# Patient Record
Sex: MALE | Race: OTHER | Employment: UNEMPLOYED
[De-identification: names, ages, dates, MRNs, and addresses within clinical notes are randomized per-mention and may not be internally consistent; named-entity substitution may affect disease eponyms.]

---

## 2023-10-12 ENCOUNTER — HOSPITAL ENCOUNTER (OUTPATIENT)
Facility: HOSPITAL | Age: 48
Setting detail: SPECIMEN
Discharge: HOME | End: 2023-10-15

## 2023-10-12 LAB
ALBUMIN SERPL-MCNC: 4.1 G/DL (ref 3.5–5)
ALBUMIN/GLOB SERPL: 1.3 (ref 1.1–2.2)
ALP SERPL-CCNC: 101 U/L (ref 45–117)
ALT SERPL-CCNC: 80 U/L (ref 12–78)
ANION GAP SERPL CALC-SCNC: 7 MMOL/L (ref 5–15)
AST SERPL-CCNC: 36 U/L (ref 15–37)
BILIRUB SERPL-MCNC: 0.5 MG/DL (ref 0.2–1)
BUN SERPL-MCNC: 16 MG/DL (ref 6–20)
BUN/CREAT SERPL: 19 (ref 12–20)
CALCIUM SERPL-MCNC: 9.6 MG/DL (ref 8.5–10.1)
CHLORIDE SERPL-SCNC: 112 MMOL/L (ref 97–108)
CHOLEST SERPL-MCNC: 189 MG/DL
CO2 SERPL-SCNC: 27 MMOL/L (ref 21–32)
CREAT SERPL-MCNC: 0.86 MG/DL (ref 0.7–1.3)
ERYTHROCYTE [DISTWIDTH] IN BLOOD BY AUTOMATED COUNT: 13.9 % (ref 11.5–14.5)
EST. AVERAGE GLUCOSE BLD GHB EST-MCNC: 108 MG/DL
GLOBULIN SER CALC-MCNC: 3.1 G/DL (ref 2–4)
GLUCOSE SERPL-MCNC: 89 MG/DL (ref 65–100)
HBA1C MFR BLD: 5.4 % (ref 4–5.6)
HCT VFR BLD AUTO: 43.5 % (ref 36.6–50.3)
HDLC SERPL-MCNC: 51 MG/DL
HDLC SERPL: 3.7 (ref 0–5)
HGB BLD-MCNC: 14.5 G/DL (ref 12.1–17)
LDLC SERPL CALC-MCNC: 110 MG/DL (ref 0–100)
MCH RBC QN AUTO: 30 PG (ref 26–34)
MCHC RBC AUTO-ENTMCNC: 33.3 G/DL (ref 30–36.5)
MCV RBC AUTO: 89.9 FL (ref 80–99)
NRBC # BLD: 0 K/UL (ref 0–0.01)
NRBC BLD-RTO: 0 PER 100 WBC
PLATELET # BLD AUTO: 263 K/UL (ref 150–400)
PMV BLD AUTO: 12 FL (ref 8.9–12.9)
POTASSIUM SERPL-SCNC: 4.1 MMOL/L (ref 3.5–5.1)
PROT SERPL-MCNC: 7.2 G/DL (ref 6.4–8.2)
RBC # BLD AUTO: 4.84 M/UL (ref 4.1–5.7)
SODIUM SERPL-SCNC: 146 MMOL/L (ref 136–145)
TRIGL SERPL-MCNC: 140 MG/DL
TSH SERPL DL<=0.05 MIU/L-ACNC: 1.51 UIU/ML (ref 0.36–3.74)
VIT B12 SERPL-MCNC: 954 PG/ML (ref 193–986)
VLDLC SERPL CALC-MCNC: 28 MG/DL
WBC # BLD AUTO: 7.5 K/UL (ref 4.1–11.1)

## 2023-10-12 PROCEDURE — 86592 SYPHILIS TEST NON-TREP QUAL: CPT

## 2023-10-12 PROCEDURE — 83036 HEMOGLOBIN GLYCOSYLATED A1C: CPT

## 2023-10-12 PROCEDURE — 85027 COMPLETE CBC AUTOMATED: CPT

## 2023-10-12 PROCEDURE — 80053 COMPREHEN METABOLIC PANEL: CPT

## 2023-10-12 PROCEDURE — 80061 LIPID PANEL: CPT

## 2023-10-12 PROCEDURE — 84443 ASSAY THYROID STIM HORMONE: CPT

## 2023-10-12 PROCEDURE — 82607 VITAMIN B-12: CPT

## 2023-10-12 PROCEDURE — 86618 LYME DISEASE ANTIBODY: CPT

## 2023-10-12 PROCEDURE — 36415 COLL VENOUS BLD VENIPUNCTURE: CPT

## 2023-10-13 LAB — RPR SER QL: NONREACTIVE

## 2023-10-16 LAB — LYME ANTIBODY: NEGATIVE

## 2023-10-25 ENCOUNTER — OFFICE VISIT (OUTPATIENT)
Age: 48
End: 2023-10-25

## 2023-10-25 DIAGNOSIS — Z71.89 COUNSELING AND COORDINATION OF CARE: Primary | ICD-10-CM

## 2023-10-25 PROCEDURE — 99080 SPECIAL REPORTS OR FORMS: CPT | Performed by: FAMILY MEDICINE

## 2023-10-25 NOTE — PROGRESS NOTES
AN financial screening is incomplete. Patient has been scheduled for [facility] on 10/31/23 to complete financial screening.

## 2023-10-31 ENCOUNTER — OFFICE VISIT (OUTPATIENT)
Age: 48
End: 2023-10-31

## 2023-10-31 ENCOUNTER — TELEMEDICINE (OUTPATIENT)
Age: 48
End: 2023-10-31

## 2023-10-31 DIAGNOSIS — Z71.89 COUNSELING AND COORDINATION OF CARE: Primary | ICD-10-CM

## 2023-10-31 DIAGNOSIS — H53.133 VISION, LOSS, SUDDEN, BILATERAL: Primary | ICD-10-CM

## 2023-10-31 DIAGNOSIS — R74.8 ABNORMAL LIVER ENZYMES: ICD-10-CM

## 2023-10-31 DIAGNOSIS — Z82.49 FAMILY HISTORY OF EARLY CAD: ICD-10-CM

## 2023-10-31 PROCEDURE — 99442 PR PHYS/QHP TELEPHONE EVALUATION 11-20 MIN: CPT | Performed by: FAMILY MEDICINE

## 2023-10-31 PROCEDURE — 99080 SPECIAL REPORTS OR FORMS: CPT | Performed by: FAMILY MEDICINE

## 2023-10-31 SDOH — ECONOMIC STABILITY: HOUSING INSECURITY
IN THE LAST 12 MONTHS, WAS THERE A TIME WHEN YOU DID NOT HAVE A STEADY PLACE TO SLEEP OR SLEPT IN A SHELTER (INCLUDING NOW)?: NO

## 2023-10-31 SDOH — ECONOMIC STABILITY: FOOD INSECURITY: WITHIN THE PAST 12 MONTHS, YOU WORRIED THAT YOUR FOOD WOULD RUN OUT BEFORE YOU GOT MONEY TO BUY MORE.: SOMETIMES TRUE

## 2023-10-31 SDOH — ECONOMIC STABILITY: FOOD INSECURITY: WITHIN THE PAST 12 MONTHS, THE FOOD YOU BOUGHT JUST DIDN'T LAST AND YOU DIDN'T HAVE MONEY TO GET MORE.: NEVER TRUE

## 2023-10-31 SDOH — ECONOMIC STABILITY: INCOME INSECURITY: HOW HARD IS IT FOR YOU TO PAY FOR THE VERY BASICS LIKE FOOD, HOUSING, MEDICAL CARE, AND HEATING?: SOMEWHAT HARD

## 2023-10-31 ASSESSMENT — PATIENT HEALTH QUESTIONNAIRE - PHQ9
2. FEELING DOWN, DEPRESSED OR HOPELESS: 1
1. LITTLE INTEREST OR PLEASURE IN DOING THINGS: 1
SUM OF ALL RESPONSES TO PHQ QUESTIONS 1-9: 2
SUM OF ALL RESPONSES TO PHQ9 QUESTIONS 1 & 2: 2

## 2023-10-31 NOTE — PROGRESS NOTES
Intake call to the pt. The pt verified his name and . The pt has suffered the loss of his sister about 3 weeks ago. Has no family support. Lives alone. The pt takes Tylenol or Diclofenac, as needed for pain. Coordination of Care  1. Have you been to the ER, urgent care clinic since your last visit? Hospitalized since your last visit? No    2. Have you seen or consulted any other health care providers outside of the [address] [place] [address] since your last visit? Include any pap smears or colon screening. No  Does the patient need refills? No    Learning Assessment Complete? no  Depression Screening complete in the past 12 months? yes

## 2023-10-31 NOTE — PROGRESS NOTES
Niranjan Escobar (: 1975) is a 57 y.o. male, Established patient, Virtual Visit for evaluation of the following chief complaint(s):   Discuss Labs (Results.)       ASSESSMENT/PLAN:  1. Vision, loss, sudden, bilateral  No recurrence. He is in process of seeing Ophthalmology. He agrees to pursue the CT of head. 2. Family history of early CAD  The report he was given about his sister was that she had something blocked or wrong with the aorta near the heart. His lipids are average and so would consider other causes (aortic) for his strong Fhx of CAD. Follow up in a few weeks to exam and consider ECHO. 3. Abnormal liver enzymes  Very mild. Discussed causes. He states he will stop daily alcohol use and will recheck in 3-6 months. Return for follow up F2F in 2-3 weeks for heart check and vision loss. (chronic care slot). SUBJECTIVE/OBJECTIVE:  VV for follow up on vision loss and labs. Vision Loss: 4 weeks ago had temporary vision loss of both eyes, could see just light and dark. Lasted 2-3 hours and then resolved. Was left with a mild dull headache. Since last visit he has not had any further spells. Is overall feeling better without headaches, weakness. He did not get CT due to cost.  He did not fill Doxycycline because he tried a natural remedy and felt better. Today pt states his sister  around the time of his sx and he thinks that grief was contributing to his sx. Denies any chest pain, palpitations, fevers, chills, eye pain, focal weakness. Fhx:  Brother found dead at 49 yo. Sister found dead at 62 yo. Hx of CAD. Uncles  of CAD  Shx:  occasional smokes several times a week;  alcohol: 1 cup of wine daily. In [place] x 2 years. PMHx:  Gastritis (2 years ago):  resolved with home remedy with corn starch and sx resolved. 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     Review of Systems     Patient-Reported Vitals  No data recorded

## 2023-10-31 NOTE — PROGRESS NOTES
AN financial screening is complete. Patient has been instructed to call AN appointment line to be scheduled with specialist on or after 11/21/23.

## 2023-10-31 NOTE — PROGRESS NOTES
Tc to the pt to assist to schedule his CT scan. He verified his name and . He did not have a preference of [facility] to go to for his CT scan. Central Scheduling was called.  The pt was scheduled for his CT at [facility] on Fri 11/10/23 @9:00 am. Arrive at 8:30 am.no Nsaids day of the exam. The Care Card was explained to the pt by the OW this am. Lesa Nettles RN

## 2023-11-10 ENCOUNTER — HOSPITAL ENCOUNTER (OUTPATIENT)
Facility: HOSPITAL | Age: 48
Discharge: HOME | End: 2023-11-10
Attending: FAMILY MEDICINE

## 2023-11-10 DIAGNOSIS — H53.133 VISION, LOSS, SUDDEN, BILATERAL: ICD-10-CM

## 2023-11-10 PROCEDURE — 6360000004 HC RX CONTRAST MEDICATION: Performed by: FAMILY MEDICINE

## 2023-11-10 PROCEDURE — 70470 CT HEAD/BRAIN W/O & W/DYE: CPT

## 2023-11-10 RX ADMIN — IOPAMIDOL 100 ML: 755 INJECTION, SOLUTION INTRAVENOUS at 09:59
